# Patient Record
Sex: MALE | Race: WHITE
[De-identification: names, ages, dates, MRNs, and addresses within clinical notes are randomized per-mention and may not be internally consistent; named-entity substitution may affect disease eponyms.]

---

## 2023-05-03 ENCOUNTER — HOSPITAL ENCOUNTER (EMERGENCY)
Dept: HOSPITAL 26 - MED | Age: 53
Discharge: HOME | End: 2023-05-03
Payer: MEDICAID

## 2023-05-03 VITALS — SYSTOLIC BLOOD PRESSURE: 119 MMHG | DIASTOLIC BLOOD PRESSURE: 70 MMHG

## 2023-05-03 VITALS — WEIGHT: 210 LBS | HEIGHT: 74 IN | BODY MASS INDEX: 26.95 KG/M2

## 2023-05-03 VITALS — SYSTOLIC BLOOD PRESSURE: 138 MMHG | DIASTOLIC BLOOD PRESSURE: 79 MMHG

## 2023-05-03 DIAGNOSIS — Z79.1: ICD-10-CM

## 2023-05-03 DIAGNOSIS — A08.4: Primary | ICD-10-CM

## 2023-05-03 DIAGNOSIS — Z79.899: ICD-10-CM

## 2023-05-03 DIAGNOSIS — Z20.822: ICD-10-CM

## 2023-05-03 PROCEDURE — 87426 SARSCOV CORONAVIRUS AG IA: CPT

## 2023-05-03 PROCEDURE — 99284 EMERGENCY DEPT VISIT MOD MDM: CPT

## 2023-05-03 PROCEDURE — 71046 X-RAY EXAM CHEST 2 VIEWS: CPT

## 2023-05-03 PROCEDURE — 87804 INFLUENZA ASSAY W/OPTIC: CPT

## 2023-05-03 NOTE — NUR
Patient discharged with v/s stable. Written and verbal after care instructions 
given.

Patient alert, oriented and verbalized understanding of instructions. 
Ambulatory with steady gait. All questions addressed prior to discharge. ID 
band removed. Patient advised to follow up with PMD. Rx of Tylenol, Ibuprofen, 
Loperamide and Zofran given. Opportunity to ask questions provided and 
answered.

WORK NOTE HANDED TO PATIENT.

## 2023-05-03 NOTE — NUR
54 yo/m presents to ED w c/o fevers, diarrhea and body aches 9/10 x1 day. pt 
took ibuprofen this morning whith some relief. pt denies chest pain, or sob. 





pmh: denies

allergies: denies